# Patient Record
Sex: FEMALE | Race: WHITE | NOT HISPANIC OR LATINO | Employment: OTHER | URBAN - METROPOLITAN AREA
[De-identification: names, ages, dates, MRNs, and addresses within clinical notes are randomized per-mention and may not be internally consistent; named-entity substitution may affect disease eponyms.]

---

## 2024-08-13 ENCOUNTER — TELEPHONE (OUTPATIENT)
Dept: CARDIOLOGY | Facility: CLINIC | Age: 79
End: 2024-08-13
Payer: COMMERCIAL

## 2024-08-13 DIAGNOSIS — I10 HTN (HYPERTENSION): Primary | ICD-10-CM

## 2024-08-13 NOTE — TELEPHONE ENCOUNTER
[facility]    Phone Message    May a detailed message be left on voicemail: yes     Reason for Call: Other: Pt would like to speak to Dr. Gunn previous pt from [facility] about dosage questions about Losartan. Pt stated Dr. Gunn said to send her a message through  and she will reach back out.     Action Taken: TE SENT    Travel Screening: Not Applicable     Date of Service:                     Thank you!  [facility]

## 2024-08-19 RX ORDER — AMLODIPINE BESYLATE 2.5 MG/1
2.5 TABLET ORAL DAILY
Status: SHIPPED
Start: 2024-08-19

## 2024-08-19 NOTE — TELEPHONE ENCOUNTER
"Left msg for patient requesting call back to establish care with Dr. Gunn at [facility].    Phone call to patient's spouse Taran - informed him that writer needs consent to communicate on file to discuss patient's health issues and that updated contact #'s had to be obtained to call him back which caused delays - Taran verbalized understanding, reported patient presently at \"hair salon\" and agreed to call back once they are home to arrange urgent follow-up with Dr. Gunn.  mg  "

## 2024-08-19 NOTE — TELEPHONE ENCOUNTER
Phone call to patient - informed her and her spouse Taran of Dr. Gunn's response - both verbalized understanding and confirmed patient has Rx for Norvasc 2.5mg which was last prescribed 6/2024 - patient agreed to continue to monitor BP and call back if she develops any new sx, very low BP or continued elevated BP.  mg

## 2024-08-19 NOTE — TELEPHONE ENCOUNTER
Unsuccessful attempts to contact patient on either home or mobile #'s listed in patient's chart - both contact #'s invalid / not in service.  Mg    Phone call to [facility] Sindy Brink [phone] to obtain updated contact #'s.  mg

## 2024-08-19 NOTE — TELEPHONE ENCOUNTER
Msg rec'd 8-16-24 @ 1456:  Shefali Gunn MD Gorshe, Maureen, AGNIESZKA Hopkins,    This is a patient of mine from [place].  She has history of HTN but meds were recently decreased during admit for sepsis and hypotension.  Unfortunately I can't see my phone notes in Care Everywhere, but my basic advice was to take what she was on at discharge and monitor BP.  If it was < 100 systolic we could decrease doses further, or if/when BP rises > 140/90 we can gradually increase meds back to prior doses (I believe at peak she was on Losartan 50mg BID and norvasc 5mg).    She has a tendancy to be quite anxious.  You can see what he rhome BP reads are and if they are in a normal range just stay on whatever she is currently taking, and if running < 100 or > 140 adjust accordingly.    Thanks,  Shefali

## 2024-08-19 NOTE — TELEPHONE ENCOUNTER
[facility]    Phone Message    May a detailed message be left on voicemail: yes     Reason for Call: Other: Pt  would like a call back asap  as he is upset that they have not heard back from the care team regarding pt      Action Taken: Other: Cardio    Travel Screening: Not Applicable     Date of Service:

## 2024-08-19 NOTE — TELEPHONE ENCOUNTER
"Rec'd transferred call from  after follow-up appt arranged - patient reported that she has not been on BP meds since having spine surgery 7-2-24, then subsequent stroke 7-3-24 - BP this am @ 800 was 161/91, vanesa @ 1300 was 157/101 - patient denied sx of HA, flushing but verbalized concerns that BP continues to \"rise\".    Reassured patient that update would be forwarded to Dr. Gunn for recommendations - understanding verbalized.  mg  "

## 2024-08-19 NOTE — TELEPHONE ENCOUNTER
Rec'd return call from patient and her spouse Taran - informed them that patient could be seen by Dr. Gunn 8-30-24 - both verbalized understanding and concerns that her BP readings are elevated and PCP would not make any med changes - instructed patient to sched appt then have  transf call back to writer to provide BP update for Dr. Gunn - both agreed to plan - call transf to sched.  mg

## 2024-08-19 NOTE — TELEPHONE ENCOUNTER
Msg rec'd 8-19-24 @ 1343:  Shefali Gunn MD Gorshe, Maureen, RN  Have her resume Norvasc 2.5mg and continue monitoring her BP and follow up with me as planned in clinic.    Thanks,  Shefali

## 2024-08-25 NOTE — PROGRESS NOTES
HEART CARE OUT-PATIENT CONSULTATON NOTE      [facility]  [phone]      Assessment/Recommendations   Assessment: 79 year old female with HTN, dizziness, altered LOC    Plan:  1.  Spells, question LOC/syncope   Unclear etiology, but consider several possible causes including transient hypotension (though running high now), consider seizure vs TIA vs arrhythmia vs other       -EEG per neurology      -14 day Zio, if negative consider ILR      -As below for HTN    2.  HTN  Quite high today, at home 130-160s      Add Losartan 25mg every evening (prior to back surgery was on 50mg BID)      -Continue Norvasc 2.5mg       -Monitor at home and follow up by phone, can slowly increase Losartan as needed        3.  Pause on prior Zio, syncope   She was seen in ER 2/22/2020 for syncope, in ER possibly with NSVT (suspect artifact, see below), Zio done for follow up and showed asymptomatic pause 5 seconds during the day, no further syncope or pre-syncope, now with recurrent episodes of LOC      -14 day Zio, if negative consider ILR      -Avoid AVN blockade      4.  ?Nonsustained VT in ER 2/2020  Nothing on two separate Zios, normal echocardiogram, no symptoms, suspect this was artifact, she states she was shivering significantly while in the ER when this was seen      -14 day Zio as above       -Consider stress test if she develops chest pain or dyspnea       -Avoid AVN blockade as above      5.  Irregular heart beat on home monitor   Single episode on home monitor with 2 Zio showing no arrhythmia, no associated symptoms when monitor indicated irregular       -Repeat Zio as above    6.  Hyperlipidemia   LDL unknown      -Continue Lipitor 40mg            History of Present Illness/Subjective    Indication for Consult:  I was asked to see Shira Velazquez by her own request for evaluation of BP concerns.  I last saw her by 3/20/2024      HPI: Shira Velazquez is a very pleasant 79 year old female with  "labile HTN, no known CAD who returns for follow up.  At last visit BP looked good.  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     Since last visit she underwent back surgery but then was admitted afterwards with transient altered LOC.  She was felt t have TIA, was advised Plavix x 3 months and echocardiogram which was normal.  She was admitted again with confusion, during that admit her BP was quite low, she was treated for sepsis and discharged off all BP meds.  She has since called in several times with concerns about BP, either worried it is too high or too low.  She recently saw neurology who advised EEG, also advised resume BP meds due to SBP 160s.  She called my office and I advised resume Losartan and come to see me.  She is here fir that visit.    She reports ever since her back surgery in July \"I have been a mess\".  She notes BP quite low at first and off all meds, now normal to high.  She denies chest pain, dyspnea, orthopnea, PND, or palpitations.  Still having syncopal spells, most recent 2 days ago she felt weak and tired and went to lay down and fell, awoke on the floor.    I reviewed notes from hospital admit, PCP prior to this visit.         Physical Examination  Past Cardiac History   Vitals: BP (!) 184/80 (BP Location: Right arm, Patient Position: Sitting, Cuff Size: Adult Regular)   Pulse 82   Resp 18   Ht 1.664 m (5' 5.5\")   Wt 76.7 kg (169 lb)   SpO2 98%   BMI 27.70 kg/m    BMI= Body mass index is 27.7 kg/m .  Wt Readings from Last 3 Encounters:   08/30/24 76.7 kg (169 lb)   08/28/11 73 kg (161 lb)       General Appearance:   no distress, normal body habitus   ENT/Mouth: membranes moist, no oral lesions or bleeding gums.      EYES:  no scleral icterus, normal conjunctivae   Neck: no carotid bruits or thyromegaly   Chest/Lungs:   lungs are clear " to auscultation, no rales or wheezing,  sternal scar, equal chest wall expansion    Cardiovascular:   Regular. Normal first and second heart sounds with no murmurs, rubs, or gallops; the carotid, radial and posterior tibial pulses are intact, Jugular venous pressure normal, No edema bilaterally    Abdomen:  no organomegaly, masses, bruits, or tenderness; bowel sounds are present   Extremities: no cyanosis or clubbing   Skin: no xanthelasma, warm.    Neurologic: normal  bilateral, no tremors       None    Zio:   8/20/2020  1. Basic rhythm is normal sinus rhythm with trivial ectopy, no sustained   arrhythmia.   2. No reported symptoms      3/11/2020  1. Basic rhythm is sinus.   2. One 5 second pause noted; asymptomatic.   3. rare PVC's and PAC's in singles.      Most Recent Echocardiogram: 7/9/2024   * The left ventricular systolic function is normal, quantified ejection   fraction is 63%.     * The left ventricle is normal size.     * Left ventricular wall motion is normal.     * The right ventricle is normal size with normal right ventricular systolic   function.    * There is mild mitral regurgitation.     * No pulmonary hypertension, estimated right ventricular systolic pressure   is 19 mmHg.     * There is a trace loculated inferolateral pericardial effusion.     * Compared to prior study on 2/19/2020, no significant changes.      Most Recent Stress Test: 11/13/2006  No WMA with stress     Most Recent Angiogram:  None           Medical History  Family History Social History   HTN  GERD  No history of premature CAD, sudden death, or cardiomyopathy      Social History     Socioeconomic History    Marital status:      Spouse name: Not on file    Number of children: Not on file    Years of education: Not on file    Highest education level: Not on file   Occupational History    Not on file   Tobacco Use    Smoking status: Never    Smokeless tobacco: Never   Substance and Sexual Activity    Alcohol use: No     Drug use: No    Sexual activity: Yes     Partners: Female   Other Topics Concern    Parent/sibling w/ CABG, MI or angioplasty before 65F 55M? Not Asked   Social History Narrative    Not on file     Social Determinants of Health     Financial Resource Strain: Not on file   Food Insecurity: No Food Insecurity (7/8/2024)    Received from [facility]     Hunger Vital Sign     Worried About Running Out of Food in the Last Year: Never true     Ran Out of Food in the Last Year: Never true   Transportation Needs: No Transportation Needs (7/8/2024)    Received from [facility]     PRAPARE - Transportation     Lack of Transportation (Medical): No     Lack of Transportation (Non-Medical): No   Physical Activity: Not on file   Stress: Not on file   Social Connections: Not on file   Interpersonal Safety: Not At Risk (7/8/2024)    Received from [facility]     Humiliation, Afraid, Rape, and Kick questionnaire     Fear of Current or Ex-Partner: No     Emotionally Abused: No     Physically Abused: No     Sexually Abused: No   Housing Stability: Low Risk  (7/8/2024)    Received from [facility]     Housing Stability Vital Sign     Unable to Pay for Housing in the Last Year: No     Number of Times Moved in the Last Year: 1     Homeless in the Last Year: No           Medications  Allergies   Current Outpatient Medications   Medication Sig Dispense Refill    acetaminophen (TYLENOL) 500 MG tablet Take 500 mg by mouth every 6 hours as needed.      aspirin 81 MG EC tablet Take 1 tablet by mouth daily.      atorvastatin (LIPITOR) 40 MG tablet Take 40 mg by mouth daily.      albuterol (ACCUNEB) 0.63 MG/3ML nebulizer solution Take 1 ampule by nebulization every 6 hours as needed.      albuterol 90 MCG/ACT inhaler Inhale 2 puffs into the lungs every 6 hours as needed.      amLODIPine (NORVASC) 2.5 MG tablet Take 1 tablet (2.5 mg) by mouth daily      cyclobenzaprine (FLEXERIL) 10 MG tablet Take 1  "tablet (10 mg) by mouth nightly as needed for muscle spasms 10 tablet 1    diclofenac (VOLTAREN) 50 MG EC tablet Take 1 tablet (50 mg) by mouth 3 times daily for 5 days 15 tablet 2    fluticasone-salmeterol (ADVAIR DISKUS) 500-50 MCG/DOSE diskus inhaler Inhale 1 puff into the lungs every 12 hours.      Montelukast Sodium (SINGULAIR PO) Take  by mouth.         Allergies   Allergen Reactions    Latex Hives    Oxycontin [Oxycodone Hcl] Hives          Lab Results    Chemistry/lipid CBC Cardiac Enzymes/BNP/TSH/INR   No results for input(s): \"CHOL\", \"HDL\", \"LDL\", \"TRIG\", \"CHOLHDLRATIO\" in the last 68947 hours.  No results for input(s): \"LDL\" in the last 12144 hours.  No results for input(s): \"NA\", \"POTASSIUM\", \"CHLORIDE\", \"CO2\", \"GLC\", \"BUN\", \"CR\", \"GFRESTIMATED\", \"[place]\" in the last 47203 hours.    Invalid input(s): \"GRFESTBLACK\"  No results for input(s): \"CR\" in the last 77982 hours.  No results for input(s): \"A1C\" in the last 77041 hours.       No results for input(s): \"WBC\", \"HGB\", \"HCT\", \"MCV\", \"PLT\" in the last 97443 hours.  No results for input(s): \"HGB\" in the last 63826 hours. No results for input(s): \"TROPONINI\" in the last 88932 hours.  No results for input(s): \"BNP\", \"NTBNPI\", \"NTBNP\" in the last 14429 hours.  No results for input(s): \"TSH\" in the last 10524 hours.  No results for input(s): \"INR\" in the last 18839 hours.     Shefali Gunn MD  Noninvasive Cardiologist   [facility]                                    "

## 2024-08-30 ENCOUNTER — OFFICE VISIT (OUTPATIENT)
Dept: CARDIOLOGY | Facility: CLINIC | Age: 79
End: 2024-08-30
Payer: COMMERCIAL

## 2024-08-30 VITALS
DIASTOLIC BLOOD PRESSURE: 90 MMHG | OXYGEN SATURATION: 98 % | BODY MASS INDEX: 27.16 KG/M2 | WEIGHT: 169 LBS | RESPIRATION RATE: 18 BRPM | SYSTOLIC BLOOD PRESSURE: 176 MMHG | HEART RATE: 82 BPM | HEIGHT: 66 IN

## 2024-08-30 DIAGNOSIS — I10 PRIMARY HYPERTENSION: ICD-10-CM

## 2024-08-30 DIAGNOSIS — R55 SYNCOPE, UNSPECIFIED SYNCOPE TYPE: Primary | ICD-10-CM

## 2024-08-30 PROCEDURE — 99204 OFFICE O/P NEW MOD 45 MIN: CPT | Performed by: INTERNAL MEDICINE

## 2024-08-30 PROCEDURE — G2211 COMPLEX E/M VISIT ADD ON: HCPCS | Performed by: INTERNAL MEDICINE

## 2024-08-30 RX ORDER — ASPIRIN 81 MG/1
1 TABLET ORAL DAILY
COMMUNITY
Start: 2024-07-04

## 2024-08-30 RX ORDER — ATORVASTATIN CALCIUM 40 MG/1
40 TABLET, FILM COATED ORAL DAILY
COMMUNITY
Start: 2024-07-04

## 2024-08-30 RX ORDER — ACETAMINOPHEN 500 MG
500 TABLET ORAL EVERY 6 HOURS PRN
COMMUNITY
Start: 2024-07-02

## 2024-08-30 NOTE — LETTER
8/30/2024    Ana Chaney PA-C  [address] [address]    RE: Shira Velazquez       Dear Colleague,     I had the pleasure of seeing Shira Velazquez in the [facility].    HEART CARE OUT-PATIENT CONSULTATON NOTE      [facility]  [phone]      Assessment/Recommendations   Assessment: 79 year old female with HTN, dizziness, altered LOC    Plan:  1.  Spells, question LOC/syncope   Unclear etiology, but consider several possible causes including transient hypotension (though running high now), consider seizure vs TIA vs arrhythmia vs other       -EEG per neurology      -14 day Zio, if negative consider ILR      -As below for HTN    2.  HTN  Quite high today, at home 130-160s      Add Losartan 25mg every evening (prior to back surgery was on 50mg BID)      -Continue Norvasc 2.5mg       -Monitor at home and follow up by phone, can slowly increase Losartan as needed        3.  Pause on prior Zio, syncope   She was seen in ER 2/22/2020 for syncope, in ER possibly with NSVT (suspect artifact, see below), Zio done for follow up and showed asymptomatic pause 5 seconds during the day, no further syncope or pre-syncope, now with recurrent episodes of LOC      -14 day Zio, if negative consider ILR      -Avoid AVN blockade      4.  ?Nonsustained VT in ER 2/2020  Nothing on two separate Zios, normal echocardiogram, no symptoms, suspect this was artifact, she states she was shivering significantly while in the ER when this was seen      -14 day Zio as above       -Consider stress test if she develops chest pain or dyspnea       -Avoid AVN blockade as above      5.  Irregular heart beat on home monitor   Single episode on home monitor with 2 Zio showing no arrhythmia, no associated symptoms when monitor indicated irregular       -Repeat Zio as above    6.  Hyperlipidemia   LDL unknown      -Continue Lipitor 40mg            History of Present  "Illness/Subjective    Indication for Consult:  I was asked to see Shira Velazquez by her own request for evaluation of BP concerns.  I last saw her by 3/20/2024      HPI: Shira Velazquez is a very pleasant 79 year old female with labile HTN, no known CAD who returns for follow up.  At last visit BP looked good.  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     Since last visit she underwent back surgery but then was admitted afterwards with transient altered LOC.  She was felt t have TIA, was advised Plavix x 3 months and echocardiogram which was normal.  She was admitted again with confusion, during that admit her BP was quite low, she was treated for sepsis and discharged off all BP meds.  She has since called in several times with concerns about BP, either worried it is too high or too low.  She recently saw neurology who advised EEG, also advised resume BP meds due to SBP 160s.  She called my office and I advised resume Losartan and come to see me.  She is here fir that visit.    She reports ever since her back surgery in July \"I have been a mess\".  She notes BP quite low at first and off all meds, now normal to high.  She denies chest pain, dyspnea, orthopnea, PND, or palpitations.  Still having syncopal spells, most recent 2 days ago she felt weak and tired and went to lay down and fell, awoke on the floor.    I reviewed notes from hospital admit, PCP prior to this visit.         Physical Examination  Past Cardiac History   Vitals: BP (!) 184/80 (BP Location: Right arm, Patient Position: Sitting, Cuff Size: Adult Regular)   Pulse 82   Resp 18   Ht 1.664 m (5' 5.5\")   Wt 76.7 kg (169 lb)   SpO2 98%   BMI 27.70 kg/m    BMI= Body mass index is 27.7 kg/m .  Wt Readings from Last 3 Encounters:   08/30/24 76.7 kg (169 lb)   08/28/11 73 kg (161 lb)       General " Appearance:   no distress, normal body habitus   ENT/Mouth: membranes moist, no oral lesions or bleeding gums.      EYES:  no scleral icterus, normal conjunctivae   Neck: no carotid bruits or thyromegaly   Chest/Lungs:   lungs are clear to auscultation, no rales or wheezing,  sternal scar, equal chest wall expansion    Cardiovascular:   Regular. Normal first and second heart sounds with no murmurs, rubs, or gallops; the carotid, radial and posterior tibial pulses are intact, Jugular venous pressure normal, No edema bilaterally    Abdomen:  no organomegaly, masses, bruits, or tenderness; bowel sounds are present   Extremities: no cyanosis or clubbing   Skin: no xanthelasma, warm.    Neurologic: normal  bilateral, no tremors       None    Zio:   8/20/2020  1. Basic rhythm is normal sinus rhythm with trivial ectopy, no sustained   arrhythmia.   2. No reported symptoms      3/11/2020  1. Basic rhythm is sinus.   2. One 5 second pause noted; asymptomatic.   3. rare PVC's and PAC's in singles.      Most Recent Echocardiogram: 7/9/2024   * The left ventricular systolic function is normal, quantified ejection   fraction is 63%.     * The left ventricle is normal size.     * Left ventricular wall motion is normal.     * The right ventricle is normal size with normal right ventricular systolic   function.    * There is mild mitral regurgitation.     * No pulmonary hypertension, estimated right ventricular systolic pressure   is 19 mmHg.     * There is a trace loculated inferolateral pericardial effusion.     * Compared to prior study on 2/19/2020, no significant changes.      Most Recent Stress Test: 11/13/2006  No WMA with stress     Most Recent Angiogram:  None           Medical History  Family History Social History   HTN  GERD  No history of premature CAD, sudden death, or cardiomyopathy      Social History     Socioeconomic History     Marital status:      Spouse name: Not on file     Number of children: Not  on file     Years of education: Not on file     Highest education level: Not on file   Occupational History     Not on file   Tobacco Use     Smoking status: Never     Smokeless tobacco: Never   Substance and Sexual Activity     Alcohol use: No     Drug use: No     Sexual activity: Yes     Partners: Female   Other Topics Concern     Parent/sibling w/ CABG, MI or angioplasty before 65F 55M? Not Asked   Social History Narrative     Not on file     Social Determinants of Health     Financial Resource Strain: Not on file   Food Insecurity: No Food Insecurity (7/8/2024)    Received from [facility]     Hunger Vital Sign      Worried About Running Out of Food in the Last Year: Never true      Ran Out of Food in the Last Year: Never true   Transportation Needs: No Transportation Needs (7/8/2024)    Received from [facility]     PRAPARE - Transportation      Lack of Transportation (Medical): No      Lack of Transportation (Non-Medical): No   Physical Activity: Not on file   Stress: Not on file   Social Connections: Not on file   Interpersonal Safety: Not At Risk (7/8/2024)    Received from [facility]     Humiliation, Afraid, Rape, and Kick questionnaire      Fear of Current or Ex-Partner: No      Emotionally Abused: No      Physically Abused: No      Sexually Abused: No   Housing Stability: Low Risk  (7/8/2024)    Received from [facility]     Housing Stability Vital Sign      Unable to Pay for Housing in the Last Year: No      Number of Times Moved in the Last Year: 1      Homeless in the Last Year: No           Medications  Allergies   Current Outpatient Medications   Medication Sig Dispense Refill     acetaminophen (TYLENOL) 500 MG tablet Take 500 mg by mouth every 6 hours as needed.       aspirin 81 MG EC tablet Take 1 tablet by mouth daily.       atorvastatin (LIPITOR) 40 MG tablet Take 40 mg by mouth daily.       albuterol (ACCUNEB) 0.63 MG/3ML nebulizer solution Take 1 ampule  "by nebulization every 6 hours as needed.       albuterol 90 MCG/ACT inhaler Inhale 2 puffs into the lungs every 6 hours as needed.       amLODIPine (NORVASC) 2.5 MG tablet Take 1 tablet (2.5 mg) by mouth daily       cyclobenzaprine (FLEXERIL) 10 MG tablet Take 1 tablet (10 mg) by mouth nightly as needed for muscle spasms 10 tablet 1     diclofenac (VOLTAREN) 50 MG EC tablet Take 1 tablet (50 mg) by mouth 3 times daily for 5 days 15 tablet 2     fluticasone-salmeterol (ADVAIR DISKUS) 500-50 MCG/DOSE diskus inhaler Inhale 1 puff into the lungs every 12 hours.       Montelukast Sodium (SINGULAIR PO) Take  by mouth.         Allergies   Allergen Reactions     Latex Hives     Oxycontin [Oxycodone Hcl] Hives          Lab Results    Chemistry/lipid CBC Cardiac Enzymes/BNP/TSH/INR   No results for input(s): \"CHOL\", \"HDL\", \"LDL\", \"TRIG\", \"CHOLHDLRATIO\" in the last 02638 hours.  No results for input(s): \"LDL\" in the last 52604 hours.  No results for input(s): \"NA\", \"POTASSIUM\", \"CHLORIDE\", \"CO2\", \"GLC\", \"BUN\", \"CR\", \"GFRESTIMATED\", \"[place]\" in the last 57167 hours.    Invalid input(s): \"GRFESTBLACK\"  No results for input(s): \"CR\" in the last 17786 hours.  No results for input(s): \"A1C\" in the last 74486 hours.       No results for input(s): \"WBC\", \"HGB\", \"HCT\", \"MCV\", \"PLT\" in the last 17283 hours.  No results for input(s): \"HGB\" in the last 34132 hours. No results for input(s): \"TROPONINI\" in the last 07636 hours.  No results for input(s): \"BNP\", \"NTBNPI\", \"NTBNP\" in the last 46615 hours.  No results for input(s): \"TSH\" in the last 40286 hours.  No results for input(s): \"INR\" in the last 26191 hours.     Shefali Gunn MD  Noninvasive Cardiologist   [facility]                                        Thank you for allowing me to participate in the care of your patient.      Sincerely,     Shefali Gunn MD     [facility]  cc:   No referring provider " defined for this encounter.

## 2024-08-30 NOTE — PATIENT INSTRUCTIONS
Continue the Amlodipine 2.5mg in the morning.    Add Losartan half tablet (25mg total dose) every evening.      Continue to monitor your blood pressure and let Dr Gunn know how it looks over the next two weeks.    I am not sure what is causing the spells, but I am concerned it might be an arrhythmia with your heart.  Will have you wear a heart monitor.

## 2024-08-30 NOTE — NURSING NOTE
Shira Velazquez arrived here on 8/30/2024 3:24 PM for 8-14 Days  Zio monitor placement per ordering provider Dr. Gunn for the diagnosis Syncope, unspecified syncope type [R55] .  Patient s skin was prepped per protocol. Dr. Gunn is the supervising MD.  Zio monitor was placed.  Instructions were reviewed with and given to the patient.  Patient verbalized understanding of wear, troubleshooting and monitor return instructions.

## 2024-09-17 ENCOUNTER — TELEPHONE (OUTPATIENT)
Dept: CARDIOLOGY | Facility: CLINIC | Age: 79
End: 2024-09-17

## 2024-09-17 PROCEDURE — 93248 EXT ECG>7D<15D REV&INTERPJ: CPT | Performed by: INTERNAL MEDICINE

## 2024-09-17 NOTE — TELEPHONE ENCOUNTER
Rec'd call from patient's spouse Taran inquiring if patient's BP readings could be emailed to Dr. Gunn for review - Taran explained that data is downloaded into clint which he can forward to Dr. Gunn.    Informed Taran that data could be sent thru [facility] since use of email is not allowed - provided Taran with [facility] access code from 8-30-24 visit AVS and requested he drop off data if he is unable to send thru [facility].  mg

## 2024-09-18 NOTE — TELEPHONE ENCOUNTER
Rec'd call from patient stating she needs to reschedule her follow-up with Dr. Gunn due to conflict and questioned why she needs appt.    Informed patient that Dr. Gunn had recommended follow-up in November when she was seen in August for follow-up on BP since she had adjusted medications - patient verbalized understanding before transferring call to  to adjust follow-up appt.  mg

## 2024-09-19 NOTE — TELEPHONE ENCOUNTER
Please review BP readings scanned to chart - follow-up sched on 12-16-24 - any new orders during interim?  mg

## 2024-09-19 NOTE — TELEPHONE ENCOUNTER
Msg rec'd 9-19-24 @ 1355:  Shefali Gunn MD Gorshe, Maureen, RN  I am very happy with those BP reads and would not make further changes at this time  Shefali    Phone call to patient - informed her and her spouse Taran of Dr. Gunn's response to BP update - both verbalized understanding, offered no questions / concerns at this time and confirmed 12-16-24 follow-up appt.  mg

## 2024-10-16 ENCOUNTER — TELEPHONE (OUTPATIENT)
Dept: CARDIOLOGY | Facility: CLINIC | Age: 79
End: 2024-10-16
Payer: COMMERCIAL

## 2024-10-16 DIAGNOSIS — I10 PRIMARY HYPERTENSION: Primary | ICD-10-CM

## 2024-10-16 NOTE — TELEPHONE ENCOUNTER
"Return phone call to Taran and pt, C2C provided over phone. Taran provided pt's BP readings for the month and inquiring if any medication adjustments need to be made:     Date BP HR   10/1/24 145/82 64   10/3/24 143/85 73   10/4/24 141/79 70   10/5/24 138/81 69   10/6/24 143/79 75   10/7/24 141/78 62   10/9/24 159/91 68   10/10/24 142/87 77   10/11/24 137/80 70   10/12/24 151/84 65   10/14/24 154/94 72   10/15/24 155/88 64   10/16/24 153/89 64     Pt states she hasn't been feeling well. Expressed pain in spine/buttocks, she is following up with her spine surgeon next week regarding this pain. Endorses having a \"bad cough\". Denies any chest pain, SOB, dizziness, lightheadedness, or any further syncopal episodes.     Reassured pt and spouse that update would be forwarded to Dr. Gunn for review and that once response received would be contacted with her response/recommendations. Understanding verbalized. LMS    "

## 2024-10-16 NOTE — TELEPHONE ENCOUNTER
[facility] Call Center    Phone Message    May a detailed message be left on voicemail: yes    Reason for Call: Spouse called requesting to speak with a member of the patients care team in regards to possibly increasing the patients losartan. Please call back to further discuss.    Thank you!  [facility]

## 2024-10-18 NOTE — TELEPHONE ENCOUNTER
Health Call Center    Phone Message    May a detailed message be left on voicemail: yes     Reason for Call: Other: Patient's spouse Taran called since they have not heard back from anyone since the last time they spoke with a nurse. They are waiting to have Dr. Gunn review information provided and see what was recommended, but no call back yet. Please call Taran back to further discuss.      Action Taken: Other: Cardiology    Travel Screening: Not Applicable     Date of Service:

## 2024-10-18 NOTE — TELEPHONE ENCOUNTER
Left detailed message for Taran informing him that once response received from Dr. Gunn will receive a call back. Instructed Taran that if BP continues to increase or pt shows new/worsening symptoms to present to the ED. Left callback number if Taran has further questions/concerns while waiting for a response from Dr. Gunn. LMS

## 2024-10-21 RX ORDER — LOSARTAN POTASSIUM 25 MG/1
25 TABLET ORAL 2 TIMES DAILY
Status: SHIPPED
Start: 2024-10-21

## 2024-10-21 NOTE — TELEPHONE ENCOUNTER
----- Message -----  From: Shefali Gunn MD  Sent: 10/21/2024  12:14 PM CDT  To: Sindhu Russo RN    Continue the Losartan 25mg every evening and add the Amlodipine 2.5mg every morning.      Shefali  ----- Message -----  From: Sindhu Russo RN  Sent: 10/21/2024  10:16 AM CDT  To: Shefali Gunn MD    Please see pt update. She has only been taking Losartan 25mg in the evening. I instructed her to resume Amlodipine 2.5mg daily and would clarify with you if any further changes were needed at this time. Pt would still like to confirm if you want her taking amlodipine or if you would like her to increase Losartan instead. Thanks! LMS    ==  Return phone call to pt. Pt stated she made a mistake this morning as her  manages her medications. Per Taran, she has been taking 2.5mg Amlodipine in the morning and 25mg Losartan in the evening. Since pt has been taking medications as prescribed, instructed pt based on Dr. Gunn's previous response to increase Losartan to 25mg BID. Per pt request, [employer] message sent with Dr. Gunn's response/recommendations. Encouraged pt to reach out if BP's continue to be elevated. Pt declined needing any prescription refills at this time. Understanding verbalized. LMS

## 2024-10-21 NOTE — TELEPHONE ENCOUNTER
----- Message -----  From: Shefali Gunn MD  Sent: 10/20/2024   5:27 PM CDT  To: Sindhu Russo RN    I am not sure what she is on currently.  Per my last note Norvasc 2.5mg every day and Losartan 25mg every day.  If that is accurate would increase Losartan to 25mg BID    ==  Phone call to pt. Confirmed how pt has been taking medications. Pt stated she has only been taking the Losartan 25mg in the evening. Pt was not aware she was to also take Amlodipine 2.5mg daily. Instructed pt to resume taking Amlodipine 2.5mg daily along with Losartan 25mg as this was instructed after 8/30/24 visit with Dr. Gunn.     Pt wanted to clarify with Dr. Gunn that this is how she is to be taking her medications as this was not made clear to her. Reassured pt that Dr. Gunn is back in the office this week and once a response received, will update her. Pt verbalized understanding and appreciation. ALEXEY

## 2024-10-29 ENCOUNTER — TRANSFERRED RECORDS (OUTPATIENT)
Dept: HEALTH INFORMATION MANAGEMENT | Facility: CLINIC | Age: 79
End: 2024-10-29

## 2024-11-09 ENCOUNTER — HEALTH MAINTENANCE LETTER (OUTPATIENT)
Age: 79
End: 2024-11-09

## 2024-11-12 ENCOUNTER — TRANSFERRED RECORDS (OUTPATIENT)
Dept: HEALTH INFORMATION MANAGEMENT | Facility: CLINIC | Age: 79
End: 2024-11-12

## 2024-11-14 ENCOUNTER — TELEPHONE (OUTPATIENT)
Dept: CARDIOLOGY | Facility: CLINIC | Age: 79
End: 2024-11-14
Payer: COMMERCIAL

## 2024-11-14 NOTE — TELEPHONE ENCOUNTER
"Dr. Gunn,  Please see patient update below.  Any new orders or recommendations in the interim?  Thank you,  Cassandra Simmons had food poisoning with vomiting Tuesday AM through Wednesday PM. She did not take in water, food or medications during this time. BP was low and she was having a hard time staying awake yesterday and today.  BP's today:  93/50  83/49  83/45  HR 52 , 67,  58.  Patient continues feeling faint this afternoon while attempting to eat. Her spouse helped her back to bed and she took a nap for an hour. She has urinated once today.   She took her losartan and amlodipine this morning and now wondering if she hold stop her medications this evening.  Patient was seen on Tuesday in Urgent Care and diagnosed with food poisoning and instructed to take fluid in. She attempting to  drinking water and tea for hydration.    She has surgery in 1 week and her spine MD gave her T3. She has taken this today or else she is in severe pain.    Instructed to bring Iris back to Urgent care for eval (because \"we do not do the ER it makes things worse\") and possible lab work to check her kidneys, since she is still symptomatic, and contact PCP for follow up to viral illness episode.    Informed that an update will be forwarded to Dr. Gunn and he will be contacted only if new recommendations are offered.          "

## 2024-11-14 NOTE — TELEPHONE ENCOUNTER
"PC to Taran with recommendations. He verbalized understanding and agrees she is dehydrated. He states she did urinate 1 more time and BP was \"a little better\". Instructed to call back with further questions. Understanding verbalized.  ----------------------------------------  Shefali Gunn MD  You4 minutes ago (3:06 PM)     BW  She should hold her HTN meds, and I agree she needs to be evaluated in UC or ER, sounds like she needs IV fluids    Shefali     "

## 2024-11-14 NOTE — TELEPHONE ENCOUNTER
[facility] Call [facility]    Phone Message    May a detailed message be left on voicemail: yes     Reason for Call: Other: Pt spouse would like to speak to someone from the care team. Pt spouse has some medication questions. Please call to further discuss.     Action Taken: Other: Cardiology     Travel Screening: Not Applicable     Thank you!  [facility]

## 2024-11-18 ENCOUNTER — TELEPHONE (OUTPATIENT)
Dept: CARDIOLOGY | Facility: CLINIC | Age: 79
End: 2024-11-18
Payer: COMMERCIAL

## 2024-11-18 NOTE — TELEPHONE ENCOUNTER
Health Call Center    Phone Message    May a detailed message be left on voicemail: yes     Reason for Call: Other: Pt states she is to have surgery on Wednesday, 11/20 however they are talking about post poneing until she can get her BP under control. Last readings have been 94/57 and 94/59. Pt also states there were medication adjustments made recently that may be having alternate affects. Please review and call pt back asap to further discuss and advise. Thank you!     Action Taken: Message routed to:  Other: [facility] [[id number]]    Travel Screening: Not Applicable     Date of Service:

## 2024-11-18 NOTE — TELEPHONE ENCOUNTER
PC to patient to discuss BP issues.   11/11  106/63  79/44  119/60  94/59  94/57  143/80  Patient continues holding BP medications due to low BP's.  Patient is in the car with her  on the way to the [facility] per PCP recommendations.  Encouraged to call back with further questions.

## 2024-12-03 ENCOUNTER — TELEPHONE (OUTPATIENT)
Dept: CARDIOLOGY | Facility: CLINIC | Age: 79
End: 2024-12-03
Payer: COMMERCIAL

## 2024-12-03 NOTE — TELEPHONE ENCOUNTER
Rec'd VM from patient's spouse Taran stating they are unsure if patient has appts on 12-4-24 and 12-16-24 and whether they are needed.  mg

## 2024-12-03 NOTE — TELEPHONE ENCOUNTER
Return call to patient's spouse Taran - informed him that 12-4-24 appt was cx'd and chg'd to 12-16-24 - encouraged Taran to keep 3mo appt since patient had reported BP issues recently - Taran verbalized understanding after additional questions addressed.  mg

## 2025-02-04 NOTE — PROGRESS NOTES
HEART CARE FOLLOW UP NOTE      [facility]  [phone]      Assessment/Recommendations   Assessment: 79 year old female with HTN, dizziness, altered LOC     Plan:  1.  Dyspnea   She relates it to nasal congestion, but also need to consider possible cardiac causes.  Had normal echocardiogram <1 year ago so less likely CHF or valves, but consider ischemia       -Consider stress test, she declines for now       -Return to clinic 6 months     2.  Spells, question LOC/syncope   Unclear etiology, but consider several possible causes including transient hypotension (though running high now), consider seizure vs TIA vs arrhythmia vs other       -Consider ILR if recurrent syncope       -As below for HTN     3.  HTN  Remains very labile, highest at home 152, lowest 76      -Continue Losartan 25mg BID       -Continue Norvasc 2.5mg but move to mid-day        -Monitor at home and follow up by phone, can slowly increase Losartan as needed      4.  Pause on prior Zio, syncope   She was seen in ER 2/22/2020 for syncope, in ER possibly with NSVT (suspect artifact, see below), Zio done for follow up and showed asymptomatic pause 5 seconds during the day.  Had recurrent syncope fall 2024, negative Zio       -Consider ILR if recurrent syncope       -Avoid AVN blockade      5.  ?Nonsustained VT in ER 2/2020  Nothing on two separate Zios, normal echocardiogram, no symptoms, suspect this was artifact, she states she was shivering significantly while in the ER when this was seen      -Consider stress test as above      -Avoid AVN blockade as above      6.  Hyperlipidemia   LDL unknown      -Continue Lipitor 40mg    The longitudinal plan of care for the diagnosis(es)/condition(s) as documented were addressed during this visit. Due to the added complexity in care, I will continue to support Iris in the subsequent management and with ongoing continuity of care.                History of Present Illness/Subjective     Indication for visit:  Shira Velazquez returns for follow up of labile BP, dizziness and was last seen on 8/30/2024 by myself.      HPI: Shira Velazquez is a very pleasant 79 year old female with labile HTN, no known CAD who returns for follow up.  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     Iris had some transient LOC after back surgery last year.  She was felt to have TIA, was advised Plavix x 3 months and echocardiogram which was normal.  She was admitted again with confusion, during that admit her BP was quite low, she was treated for sepsis and discharged off all BP meds.  When I last saw her BP was normal to high, no further lows.  I advised Zio that was negative and resume Losartan.  EEG was negative for seizures     Since last visit she was reporting some low BPs and held medications, admitted 11/20/2024 to [facility] for planned spine surgery, BP quite high that admit.  Per discharge summary was on Losartan 50mg BID and Norvasc 2.5mg.    She returns for follow up and reports BP remains labile.  Notes on-going dyspnea with any activity, saw ENT and told vocal cords .  Gets very congested, better by mid-day and less short of breath.  No orthopnea or PND.  No chest pain.      I reviewed notes from neurology, PCP prior to this visit.         Physical Examination  Past Cardiac History   BP (!) 144/70 (BP Location: Left arm, Patient Position: Sitting, Cuff Size: Adult Regular)   Pulse 72   Resp 14   Wt 77.6 kg (171 lb)   BMI 28.02 kg/m     Wt Readings from Last 3 Encounters:   08/30/24 76.7 kg (169 lb)   08/28/11 73 kg (161 lb)       General Appearance:   no distress, normal body habitus   ENT/Mouth: membranes moist, no oral lesions or bleeding gums.      EYES:  no scleral icterus, normal conjunctivae   Neck: no carotid bruits or thyromegaly   Chest/Lungs:    lungs are clear to auscultation, no rales or wheezing,  sternal scar, equal chest wall expansion    Cardiovascular:   Regular. Normal first and second heart sounds with no murmurs, rubs, or gallops; the carotid, radial and posterior tibial pulses are intact, Jugular venous pressure normal, no edema bilaterally    Abdomen:  no organomegaly, masses, bruits, or tenderness; bowel sounds are present   Extremities: no cyanosis or clubbing   Skin: no xanthelasma, warm.    Neurologic: normal  bilateral, no tremors         None     Zio: 9/17/2024  Zio monitoring from 8/30/2024 to 9/13/2024 (duration 13d 17h).  Predominant underlying rhythm was sinus rhythm, 53 to 125bpm, average 73bpm.  No nonsustained or sustained tachyarrhythmias.  No atrial fibrillation.  There were no pauses of greater than 3 seconds.  Rare supraventricular ectopic beats (<1%).  Rare premature ventricular contractions (<1%).  Symptom triggers - none.     Most Recent Echocardiogram: 7/9/2024   * The left ventricular systolic function is normal, quantified ejection   fraction is 63%.     * The left ventricle is normal size.     * Left ventricular wall motion is normal.     * The right ventricle is normal size with normal right ventricular systolic   function.    * There is mild mitral regurgitation.     * No pulmonary hypertension, estimated right ventricular systolic pressure   is 19 mmHg.     * There is a trace loculated inferolateral pericardial effusion.     * Compared to prior study on 2/19/2020, no significant changes.      Most Recent Stress Test: 11/13/2006  No WMA with stress     Most Recent Angiogram:  None    ECG (reviewed by myself): 7/3/2024  bpm           Medical History  Family History Social History   HTN  GERD No history of premature CAD, sudden death, or cardiomyopathy      Social History     Socioeconomic History    Marital status:      Spouse name: Not on file    Number of children: Not on file    Years of education:  Not on file    Highest education level: Not on file   Occupational History    Not on file   Tobacco Use    Smoking status: Never    Smokeless tobacco: Never   Substance and Sexual Activity    Alcohol use: No    Drug use: No    Sexual activity: Yes     Partners: Female   Other Topics Concern    Parent/sibling w/ CABG, MI or angioplasty before 65F 55M? Not Asked   Social History Narrative    Not on file     Social Drivers of Health     Financial Resource Strain: Not on file   Food Insecurity: No Food Insecurity (7/8/2024)    Received from [facility]     Hunger Vital Sign     Worried About Running Out of Food in the Last Year: Never true     Ran Out of Food in the Last Year: Never true   Transportation Needs: No Transportation Needs (7/8/2024)    Received from [facility]     PRAPARE - Transportation     Lack of Transportation (Medical): No     Lack of Transportation (Non-Medical): No   Physical Activity: Not on file   Stress: Not on file   Social Connections: Not on file   Interpersonal Safety: Not At Risk (7/8/2024)    Received from [facility]     Humiliation, Afraid, Rape, and Kick questionnaire     Fear of Current or Ex-Partner: No     Emotionally Abused: No     Physically Abused: No     Sexually Abused: No   Housing Stability: Low Risk  (7/8/2024)    Received from [facility]     Housing Stability Vital Sign     Unable to Pay for Housing in the Last Year: No     Number of Times Moved in the Last Year: 1     Homeless in the Last Year: No           Medications  Allergies   Current Outpatient Medications   Medication Sig Dispense Refill    acetaminophen (TYLENOL) 500 MG tablet Take 500 mg by mouth every 6 hours as needed.      albuterol (ACCUNEB) 0.63 MG/3ML nebulizer solution Take 1 ampule by nebulization every 6 hours as needed.      albuterol 90 MCG/ACT inhaler Inhale 2 puffs into the lungs every 6 hours as needed.      amLODIPine (NORVASC) 2.5 MG tablet Take 1 tablet (2.5  "mg) by mouth daily      aspirin 81 MG EC tablet Take 1 tablet by mouth daily.      atorvastatin (LIPITOR) 40 MG tablet Take 40 mg by mouth daily.      FEROSUL 325 (65 Fe) MG tablet Take 0.5 tablets by mouth daily at 2 pm.      losartan (COZAAR) 25 MG tablet Take 1 tablet (25 mg) by mouth 2 times daily.      pantoprazole (PROTONIX) 40 MG EC tablet Take 40 mg by mouth daily.      pregabalin (LYRICA) 50 MG capsule Take 50 mg by mouth 2 times daily.      senna (SENOKOT) 8.6 MG tablet Take 4 tablets by mouth daily.      traZODone (DESYREL) 100 MG tablet Take 100 mg by mouth at bedtime.      vitamin (B COMPLEX) capsule Take 1 capsule by mouth daily.       No current facility-administered medications for this visit.        Allergies   Allergen Reactions    Latex Hives    Oxycontin [Oxycodone Hcl] Hives          Lab Results    Chemistry/lipid CBC Cardiac Enzymes/BNP/TSH/INR   No results for input(s): \"CHOL\", \"HDL\", \"LDL\", \"TRIG\", \"CHOLHDLRATIO\" in the last 36808 hours.  No results for input(s): \"LDL\" in the last 48730 hours.  No results for input(s): \"NA\", \"POTASSIUM\", \"CHLORIDE\", \"CO2\", \"GLC\", \"BUN\", \"CR\", \"GFRESTIMATED\", \"[place]\" in the last 97453 hours.    Invalid input(s): \"GRFESTBLACK\"  No results for input(s): \"CR\" in the last 93406 hours.  No results for input(s): \"A1C\" in the last 27509 hours.       No results for input(s): \"WBC\", \"HGB\", \"HCT\", \"MCV\", \"PLT\" in the last 49054 hours.  No results for input(s): \"HGB\" in the last 30034 hours. No results for input(s): \"TROPONINI\" in the last 92209 hours.  No results for input(s): \"BNP\", \"NTBNPI\", \"NTBNP\" in the last 93087 hours.  No results for input(s): \"TSH\" in the last 29354 hours.  No results for input(s): \"INR\" in the last 23740 hours.     Shefali Gunn MD  Noninvasive Cardiologist   [facility]                "

## 2025-02-11 ENCOUNTER — OFFICE VISIT (OUTPATIENT)
Dept: CARDIOLOGY | Facility: CLINIC | Age: 80
End: 2025-02-11
Payer: COMMERCIAL

## 2025-02-11 VITALS
SYSTOLIC BLOOD PRESSURE: 144 MMHG | HEART RATE: 72 BPM | WEIGHT: 171 LBS | DIASTOLIC BLOOD PRESSURE: 70 MMHG | RESPIRATION RATE: 14 BRPM | BODY MASS INDEX: 28.02 KG/M2

## 2025-02-11 DIAGNOSIS — E78.5 HYPERLIPIDEMIA LDL GOAL <100: ICD-10-CM

## 2025-02-11 DIAGNOSIS — R06.09 DOE (DYSPNEA ON EXERTION): Primary | ICD-10-CM

## 2025-02-11 DIAGNOSIS — I10 PRIMARY HYPERTENSION: ICD-10-CM

## 2025-02-11 PROCEDURE — 99214 OFFICE O/P EST MOD 30 MIN: CPT | Performed by: INTERNAL MEDICINE

## 2025-02-11 PROCEDURE — G2211 COMPLEX E/M VISIT ADD ON: HCPCS | Performed by: INTERNAL MEDICINE

## 2025-02-11 RX ORDER — PANTOPRAZOLE SODIUM 40 MG/1
40 TABLET, DELAYED RELEASE ORAL DAILY
COMMUNITY

## 2025-02-11 RX ORDER — FERROUS SULFATE 325(65) MG
0.5 TABLET ORAL
COMMUNITY
Start: 2024-07-15

## 2025-02-11 RX ORDER — TRAZODONE HYDROCHLORIDE 100 MG/1
100 TABLET ORAL AT BEDTIME
COMMUNITY

## 2025-02-11 RX ORDER — SENNOSIDES A AND B 8.6 MG/1
4 TABLET, FILM COATED ORAL DAILY
COMMUNITY
Start: 2024-11-20

## 2025-02-11 RX ORDER — PREGABALIN 50 MG/1
50 CAPSULE ORAL 2 TIMES DAILY
COMMUNITY

## 2025-02-11 RX ORDER — VITAMIN B COMPLEX
1 CAPSULE ORAL DAILY
COMMUNITY

## 2025-02-11 NOTE — PATIENT INSTRUCTIONS
I do no want to increase or decrease your blood pressure medications.  If possible to remember try taking the Amlodipine 2.5mg at mid-day and continue the Losartan 25mg in the morning and evening.    I am not sure what is making you short of breath.  Could be from nasal congestion, but also should consider checking for blocked vessels to your heart (with a stress test).  If you have shortness of breath with activity even in the absence of nasal congestion, or develop chest pain with activity then I would definitely advise a stress test

## 2025-02-11 NOTE — LETTER
2/11/2025    Ana Chaney PA-C  [address] [address]    RE: Shira Velazquez       Dear Colleague,     I had the pleasure of seeing Shira Velazquez in the [facility].  HEART CARE FOLLOW UP NOTE      [facility]  [phone]      Assessment/Recommendations   Assessment: 79 year old female with HTN, dizziness, altered LOC     Plan:  1.  Dyspnea   She relates it to nasal congestion, but also need to consider possible cardiac causes.  Had normal echocardiogram <1 year ago so less likely CHF or valves, but consider ischemia       -Consider stress test, she declines for now       -Return to clinic 6 months     2.  Spells, question LOC/syncope   Unclear etiology, but consider several possible causes including transient hypotension (though running high now), consider seizure vs TIA vs arrhythmia vs other       -Consider ILR if recurrent syncope       -As below for HTN     3.  HTN  Remains very labile, highest at home 152, lowest 76      -Continue Losartan 25mg BID       -Continue Norvasc 2.5mg but move to mid-day        -Monitor at home and follow up by phone, can slowly increase Losartan as needed      4.  Pause on prior Zio, syncope   She was seen in ER 2/22/2020 for syncope, in ER possibly with NSVT (suspect artifact, see below), Zio done for follow up and showed asymptomatic pause 5 seconds during the day.  Had recurrent syncope fall 2024, negative Zio       -Consider ILR if recurrent syncope       -Avoid AVN blockade      5.  ?Nonsustained VT in ER 2/2020  Nothing on two separate Zios, normal echocardiogram, no symptoms, suspect this was artifact, she states she was shivering significantly while in the ER when this was seen      -Consider stress test as above      -Avoid AVN blockade as above      6.  Hyperlipidemia   LDL unknown      -Continue Lipitor 40mg    The longitudinal plan of care for the diagnosis(es)/condition(s) as documented  were addressed during this visit. Due to the added complexity in care, I will continue to support Iris in the subsequent management and with ongoing continuity of care.                History of Present Illness/Subjective    Indication for visit:  Shira Velazquez returns for follow up of labile BP, dizziness and was last seen on 8/30/2024 by myself.      HPI: Shira Velazquez is a very pleasant 79 year old female with labile HTN, no known CAD who returns for follow up.  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     Iris had some transient LOC after back surgery last year.  She was felt to have TIA, was advised Plavix x 3 months and echocardiogram which was normal.  She was admitted again with confusion, during that admit her BP was quite low, she was treated for sepsis and discharged off all BP meds.  When I last saw her BP was normal to high, no further lows.  I advised Zio that was negative and resume Losartan.  EEG was negative for seizures     Since last visit she was reporting some low BPs and held medications, admitted 11/20/2024 to [facility] for planned spine surgery, BP quite high that admit.  Per discharge summary was on Losartan 50mg BID and Norvasc 2.5mg.    She returns for follow up and reports BP remains labile.  Notes on-going dyspnea with any activity, saw ENT and told vocal cords .  Gets very congested, better by mid-day and less short of breath.  No orthopnea or PND.  No chest pain.      I reviewed notes from neurology, PCP prior to this visit.         Physical Examination  Past Cardiac History   BP (!) 144/70 (BP Location: Left arm, Patient Position: Sitting, Cuff Size: Adult Regular)   Pulse 72   Resp 14   Wt 77.6 kg (171 lb)   BMI 28.02 kg/m     Wt Readings from Last 3 Encounters:   08/30/24 76.7 kg (169 lb)   08/28/11 73 kg (161 lb)        General Appearance:   no distress, normal body habitus   ENT/Mouth: membranes moist, no oral lesions or bleeding gums.      EYES:  no scleral icterus, normal conjunctivae   Neck: no carotid bruits or thyromegaly   Chest/Lungs:   lungs are clear to auscultation, no rales or wheezing,  sternal scar, equal chest wall expansion    Cardiovascular:   Regular. Normal first and second heart sounds with no murmurs, rubs, or gallops; the carotid, radial and posterior tibial pulses are intact, Jugular venous pressure normal, no edema bilaterally    Abdomen:  no organomegaly, masses, bruits, or tenderness; bowel sounds are present   Extremities: no cyanosis or clubbing   Skin: no xanthelasma, warm.    Neurologic: normal  bilateral, no tremors         None     Zio: 9/17/2024  Zio monitoring from 8/30/2024 to 9/13/2024 (duration 13d 17h).  Predominant underlying rhythm was sinus rhythm, 53 to 125bpm, average 73bpm.  No nonsustained or sustained tachyarrhythmias.  No atrial fibrillation.  There were no pauses of greater than 3 seconds.  Rare supraventricular ectopic beats (<1%).  Rare premature ventricular contractions (<1%).  Symptom triggers - none.     Most Recent Echocardiogram: 7/9/2024   * The left ventricular systolic function is normal, quantified ejection   fraction is 63%.     * The left ventricle is normal size.     * Left ventricular wall motion is normal.     * The right ventricle is normal size with normal right ventricular systolic   function.    * There is mild mitral regurgitation.     * No pulmonary hypertension, estimated right ventricular systolic pressure   is 19 mmHg.     * There is a trace loculated inferolateral pericardial effusion.     * Compared to prior study on 2/19/2020, no significant changes.      Most Recent Stress Test: 11/13/2006  No WMA with stress     Most Recent Angiogram:  None    ECG (reviewed by myself): 7/3/2024  bpm           Medical History  Family History Social History    HTN  GERD No history of premature CAD, sudden death, or cardiomyopathy      Social History     Socioeconomic History     Marital status:      Spouse name: Not on file     Number of children: Not on file     Years of education: Not on file     Highest education level: Not on file   Occupational History     Not on file   Tobacco Use     Smoking status: Never     Smokeless tobacco: Never   Substance and Sexual Activity     Alcohol use: No     Drug use: No     Sexual activity: Yes     Partners: Female   Other Topics Concern     Parent/sibling w/ CABG, MI or angioplasty before 65F 55M? Not Asked   Social History Narrative     Not on file     Social Drivers of Health     Financial Resource Strain: Not on file   Food Insecurity: No Food Insecurity (7/8/2024)    Received from [facility]     Hunger Vital Sign      Worried About Running Out of Food in the Last Year: Never true      Ran Out of Food in the Last Year: Never true   Transportation Needs: No Transportation Needs (7/8/2024)    Received from [facility]     PRAPARE - Transportation      Lack of Transportation (Medical): No      Lack of Transportation (Non-Medical): No   Physical Activity: Not on file   Stress: Not on file   Social Connections: Not on file   Interpersonal Safety: Not At Risk (7/8/2024)    Received from [facility]     Humiliation, Afraid, Rape, and Kick questionnaire      Fear of Current or Ex-Partner: No      Emotionally Abused: No      Physically Abused: No      Sexually Abused: No   Housing Stability: Low Risk  (7/8/2024)    Received from [facility]     Housing Stability Vital Sign      Unable to Pay for Housing in the Last Year: No      Number of Times Moved in the Last Year: 1      Homeless in the Last Year: No           Medications  Allergies   Current Outpatient Medications   Medication Sig Dispense Refill     acetaminophen (TYLENOL) 500 MG tablet Take 500 mg by mouth every 6 hours as needed.    "    albuterol (ACCUNEB) 0.63 MG/3ML nebulizer solution Take 1 ampule by nebulization every 6 hours as needed.       albuterol 90 MCG/ACT inhaler Inhale 2 puffs into the lungs every 6 hours as needed.       amLODIPine (NORVASC) 2.5 MG tablet Take 1 tablet (2.5 mg) by mouth daily       aspirin 81 MG EC tablet Take 1 tablet by mouth daily.       atorvastatin (LIPITOR) 40 MG tablet Take 40 mg by mouth daily.       FEROSUL 325 (65 Fe) MG tablet Take 0.5 tablets by mouth daily at 2 pm.       losartan (COZAAR) 25 MG tablet Take 1 tablet (25 mg) by mouth 2 times daily.       pantoprazole (PROTONIX) 40 MG EC tablet Take 40 mg by mouth daily.       pregabalin (LYRICA) 50 MG capsule Take 50 mg by mouth 2 times daily.       senna (SENOKOT) 8.6 MG tablet Take 4 tablets by mouth daily.       traZODone (DESYREL) 100 MG tablet Take 100 mg by mouth at bedtime.       vitamin (B COMPLEX) capsule Take 1 capsule by mouth daily.       No current facility-administered medications for this visit.        Allergies   Allergen Reactions     Latex Hives     Oxycontin [Oxycodone Hcl] Hives          Lab Results    Chemistry/lipid CBC Cardiac Enzymes/BNP/TSH/INR   No results for input(s): \"CHOL\", \"HDL\", \"LDL\", \"TRIG\", \"CHOLHDLRATIO\" in the last 84609 hours.  No results for input(s): \"LDL\" in the last 11960 hours.  No results for input(s): \"NA\", \"POTASSIUM\", \"CHLORIDE\", \"CO2\", \"GLC\", \"BUN\", \"CR\", \"GFRESTIMATED\", \"[place]\" in the last 21767 hours.    Invalid input(s): \"GRFESTBLACK\"  No results for input(s): \"CR\" in the last 67751 hours.  No results for input(s): \"A1C\" in the last 75874 hours.       No results for input(s): \"WBC\", \"HGB\", \"HCT\", \"MCV\", \"PLT\" in the last 95252 hours.  No results for input(s): \"HGB\" in the last 61069 hours. No results for input(s): \"TROPONINI\" in the last 31045 hours.  No results for input(s): \"BNP\", \"NTBNPI\", \"NTBNP\" in the last 84549 hours.  No results for input(s): \"TSH\" in the last 46281 hours.  No results for " "input(s): \"INR\" in the last 06092 hours.     Shefali Gunn MD  Noninvasive Cardiologist   [facility]                  Thank you for allowing me to participate in the care of your patient.      Sincerely,     Shefali Gunn MD     [facility]  cc:   No referring provider defined for this encounter.      "

## 2025-02-13 ENCOUNTER — TRANSFERRED RECORDS (OUTPATIENT)
Dept: HEALTH INFORMATION MANAGEMENT | Facility: CLINIC | Age: 80
End: 2025-02-13

## 2025-02-13 LAB
ALT SERPL-CCNC: 24 U/L
AST SERPL-CCNC: 27 U/L (ref 10–40)
CHOLESTEROL (EXTERNAL): 152 MG/DL
CREATININE (EXTERNAL): 0.7 MG/DL (ref 0.5–1.2)
GFR ESTIMATED (EXTERNAL): 88 ML/MIN/1.73
GLUCOSE (EXTERNAL): 94 MG/DL (ref 65–99)
HDLC SERPL-MCNC: 62 MG/DL (ref 40–60)
LDL CHOLESTEROL CALCULATED (EXTERNAL): 69 MG/DL
POTASSIUM (EXTERNAL): 4.2 MMOL/L (ref 3.5–5)
TRIGLYCERIDES (EXTERNAL): 107 MG/DL (ref 35–160)

## 2025-02-19 ENCOUNTER — TRANSFERRED RECORDS (OUTPATIENT)
Dept: HEALTH INFORMATION MANAGEMENT | Facility: CLINIC | Age: 80
End: 2025-02-19

## 2025-06-03 ENCOUNTER — TELEPHONE (OUTPATIENT)
Dept: CARDIOLOGY | Facility: CLINIC | Age: 80
End: 2025-06-03
Payer: COMMERCIAL

## 2025-06-03 DIAGNOSIS — I10 HTN (HYPERTENSION): ICD-10-CM

## 2025-06-03 RX ORDER — AMLODIPINE BESYLATE 2.5 MG/1
2.5 TABLET ORAL DAILY
Qty: 90 TABLET | Refills: 1 | Status: SHIPPED | OUTPATIENT
Start: 2025-06-03

## 2025-06-03 NOTE — TELEPHONE ENCOUNTER
Rec'd VM from patient stating she is having problems getting her Norvasc Rx refilled for unknown reasons and is now out - patient reported that she did schedule follow-up with Dr. Gunn for August.    Noted per Dr. Gunn's 2-11-25 visit note:  -Continue Norvasc 2.5mg but move to mid-day     Return call to patient who stated she had contacted [facility] about 1mo ago with refill request and was told it had not been approved - patient explained that her  also called but does not recall who he spoke to - patient ran out 3 days ago and reports that she feels fine and BP's have been okay.    Informed patient that chart records do not indicate any call from spouse or refill request - confirmed preferred pharmacy and 8-6-25 follow-up appt - encouraged patient to contact writer directly in the future if she does not get a response to Rx refill request within 3 days - patient verbalized understanding.  mg

## 2025-08-06 ENCOUNTER — OFFICE VISIT (OUTPATIENT)
Dept: CARDIOLOGY | Facility: CLINIC | Age: 80
End: 2025-08-06
Payer: COMMERCIAL

## 2025-08-06 ENCOUNTER — TELEPHONE (OUTPATIENT)
Dept: CARDIOLOGY | Facility: CLINIC | Age: 80
End: 2025-08-06

## 2025-08-06 VITALS
DIASTOLIC BLOOD PRESSURE: 64 MMHG | BODY MASS INDEX: 27.53 KG/M2 | HEART RATE: 76 BPM | SYSTOLIC BLOOD PRESSURE: 138 MMHG | RESPIRATION RATE: 14 BRPM | WEIGHT: 168 LBS

## 2025-08-06 DIAGNOSIS — R55 VASOVAGAL SYNCOPE: ICD-10-CM

## 2025-08-06 DIAGNOSIS — I10 PRIMARY HYPERTENSION: Primary | ICD-10-CM

## 2025-08-06 DIAGNOSIS — E78.5 HYPERLIPIDEMIA LDL GOAL <100: ICD-10-CM

## 2025-08-06 DIAGNOSIS — R06.09 DOE (DYSPNEA ON EXERTION): ICD-10-CM

## 2025-08-06 PROCEDURE — 3075F SYST BP GE 130 - 139MM HG: CPT | Performed by: INTERNAL MEDICINE

## 2025-08-06 PROCEDURE — G2211 COMPLEX E/M VISIT ADD ON: HCPCS | Performed by: INTERNAL MEDICINE

## 2025-08-06 PROCEDURE — 3078F DIAST BP <80 MM HG: CPT | Performed by: INTERNAL MEDICINE

## 2025-08-06 PROCEDURE — 99214 OFFICE O/P EST MOD 30 MIN: CPT | Performed by: INTERNAL MEDICINE
